# Patient Record
Sex: MALE | Race: BLACK OR AFRICAN AMERICAN
[De-identification: names, ages, dates, MRNs, and addresses within clinical notes are randomized per-mention and may not be internally consistent; named-entity substitution may affect disease eponyms.]

---

## 2019-06-14 ENCOUNTER — HOSPITAL ENCOUNTER (EMERGENCY)
Dept: HOSPITAL 97 - ER | Age: 39
Discharge: HOME | End: 2019-06-14
Payer: COMMERCIAL

## 2019-06-14 DIAGNOSIS — R73.03: ICD-10-CM

## 2019-06-14 DIAGNOSIS — F17.210: ICD-10-CM

## 2019-06-14 DIAGNOSIS — L08.9: Primary | ICD-10-CM

## 2019-06-14 DIAGNOSIS — R03.0: ICD-10-CM

## 2019-06-14 PROCEDURE — 99283 EMERGENCY DEPT VISIT LOW MDM: CPT

## 2019-06-14 NOTE — EDPHYS
Physician Documentation                                                                           

 [facility] – [place], [place]                                                                 

Name: Charleen Ni                                                                                 

Age: 38 yrs                                                                                       

Sex: Male                                                                                         

: 1980                                                                                   

MRN: [id number]                                                                                   

Arrival Date: 2019                                                                          

Time: 15:10                                                                                       

Account#: [id number]                                                                            

Bed 25                                                                                            

Private MD: Michael Peter                                                                         

ED Physician Manan Staley                                                                       

HPI:                                                                                              

                                                                                             

15:29 This 38 yrs old Black Male presents to ER via Ambulatory with complaints of Skin        kb  

      Sore(s).                                                                                    

15:29 the patient presents with a swollen area of the right base of the skull. Description:   kb  

      swollen. Onset: The symptoms/episode began/occurred 1 week(s) ago. Possible cause(s):       

      unknown. Associated signs and symptoms: The patient has no apparent associated signs or     

      symptoms. Modifying factors: the symptoms are alleviated by nothing, the symptoms are       

      aggravated by pressure, touching. Severity of symptoms: At their worst the symptoms         

      were mild, in the emergency department the symptoms are unchanged. The patient has not      

      experienced similar symptoms in the past. The patient has not recently seen a               

      physician. Pt reports he noticed a knot on his scalp a week ago and it hasn't gotten        

      better. Thought it was an ingrown hair so he had someone try to get it out without          

      success. Reports pain on palpation.                                                         

                                                                                                  

Historical:                                                                                       

- Allergies:                                                                                      

15:18 NKDA;                                                                                   aa5 

- PMHx:                                                                                           

15:18 Borderline diabetes; Borderline hypertension;                                           aa5 

- PSHx:                                                                                           

15:18 None;                                                                                   aa5 

                                                                                                  

- Immunization history:: Adult Immunizations up to date.                                          

- Social history:: Smoking status: Patient uses tobacco products, smokes one-half pack            

  cigarettes per day.                                                                             

- Ebola Screening: : No symptoms or risks identified at this time.                                

                                                                                                  

                                                                                                  

ROS:                                                                                              

15:29 Constitutional: Negative for fever, chills, and weight loss, Cardiovascular: Negative   kb  

      for chest pain, palpitations, and edema, Respiratory: Negative for shortness of breath,     

      cough, wheezing, and pleuritic chest pain, Abdomen/GI: Negative for abdominal pain,         

      nausea, vomiting, diarrhea, and constipation, MS/Extremity: Negative for injury and         

      deformity, Neuro: Negative for headache, weakness, numbness, tingling, and seizure.         

15:29 Skin: Positive for swelling, of the right base of the skull.                                

                                                                                                  

Exam:                                                                                             

15:29 Constitutional:  This is a well developed, well nourished patient who is awake, alert,  kb  

      and in no acute distress. Head/Face:  Normocephalic, atraumatic. Chest/axilla:  Normal      

      chest wall appearance and motion.  Nontender with no deformity.  No lesions are             

      appreciated. Cardiovascular:  Regular rate and rhythm with a normal S1 and S2.  No          

      gallops, murmurs, or rubs.  Normal PMI, no JVD.  No pulse deficits. Respiratory:  Lungs     

      have equal breath sounds bilaterally, clear to auscultation and percussion.  No rales,      

      rhonchi or wheezes noted.  No increased work of breathing, no retractions or nasal          

      flaring. Abdomen/GI:  Soft, non-tender, with normal bowel sounds.  No distension or         

      tympany.  No guarding or rebound.  No evidence of tenderness throughout. MS/ Extremity:     

       Pulses equal, no cyanosis.  Neurovascular intact.  Full, normal range of motion.           

      Neuro:  Awake and alert, GCS 15, oriented to person, place, time, and situation.            

      Cranial nerves II-XII grossly intact.  Motor strength 5/5 in all extremities.  Sensory      

      grossly intact.  Cerebellar exam normal.  Normal gait.                                      

15:29 Skin: Appearance: normal except for affected area, Color: normal in color, swelling,        

      noted on the right base of the skull, that are mild.                                        

                                                                                                  

Vital Signs:                                                                                      

15:18  / 84; Pulse 77; Resp 16 S; Temp 98.6(TE); Pulse Ox 99% on R/A; Weight 99.79 kg   aa5 

      (R); Height 6 ft. 3 in. (190.50 cm) (R); Pain 0/10;                                         

15:18 Body Mass Index 27.50 (99.79 kg, 190.50 cm)                                             aa5 

                                                                                                  

MDM:                                                                                              

15:19 Patient medically screened.                                                             kb  

15:29 Data reviewed: vital signs, nurses notes. Data interpreted: Pulse oximetry: on room air kb  

      is 99 %. Interpretation: normal. Counseling: I had a detailed discussion with the           

      patient and/or guardian regarding: the historical points, exam findings, and any            

      diagnostic results supporting the discharge/admit diagnosis, the need for outpatient        

      follow up, a family practitioner, to return to the emergency department if symptoms         

      worsen or persist or if there are any questions or concerns that arise at home.             

                                                                                                  

Administered Medications:                                                                         

15:35 Drug: KeFLEX 500 mg Route: PO;                                                          ca1 

15:42 Follow up: Response: Medication administered at discharge.                              ca1 

                                                                                                  

                                                                                                  

Disposition:                                                                                      

18:07 Co-signature as Attending Physician, Manan Staley MD I agree with the assessment and   kdr 

      plan of care.                                                                               

                                                                                                  

Disposition:                                                                                      

19 15:32 Discharged to Home. Impression: Local infection of the skin and subcutaneous       

  tissue, unspecified.                                                                            

- Condition is Stable.                                                                            

- Discharge Instructions: Skin Abscess, Easy-to-Read.                                             

- Prescriptions for Keflex 500 mg Oral Capsule - take 1 capsule by ORAL route every 8             

  hours for 7 days; 21 capsule.                                                                   

- Medication Reconciliation Form, Thank You Letter, Antibiotic Education, Prescription            

  Opioid Use form.                                                                                

- Follow up: Emergency Department; When: As needed; Reason: Worsening of condition.               

  Follow up: Private Physician; When: 2 - 3 days; Reason: Recheck today's complaints,             

  Continuance of care, Re-evaluation by your physician.                                           

                                                                                                  

                                                                                                  

                                                                                                  

Signatures:                                                                                       

Rebecca Huston, FNP-C                 FNP-Manan Becerra MD MD   kdr                                                  

Ximena Sheikh RN                     RN   aa5                                                  

Simona Celaya RN                        RN   ca1                                                  

                                                                                                  

Corrections: (The following items were deleted from the chart)                                    

15:45 15:32 2019 15:32 Discharged to Home. Impression: Local infection of the skin and  ca1 

      subcutaneous tissue, unspecified. Condition is Stable. Forms are Medication                 

      Reconciliation Form, Thank You Letter, Antibiotic Education, Prescription Opioid Use.       

      Follow up: Emergency Department; When: As needed; Reason: Worsening of condition.           

      Follow up: Private Physician; When: 2 - 3 days; Reason: Recheck today's complaints,         

      Continuance of care, Re-evaluation by your physician. kb                                    

                                                                                                  

**************************************************************************************************

## 2019-06-14 NOTE — ER
Nurse's Notes                                                                                     

 [facility]                                                                 

Name: Charleen Ni                                                                                 

Age: 38 yrs                                                                                       

Sex: Male                                                                                         

: 1980                                                                                   

MRN: [id number]                                                                                   

Arrival Date: 2019                                                                          

Time: 15:10                                                                                       

Account#: [id number]                                                                            

Bed 25                                                                                            

Private MD: Michael Peter                                                                         

Diagnosis: Local infection of the skin and subcutaneous tissue, unspecified                       

                                                                                                  

Presentation:                                                                                     

                                                                                             

15:17 Presenting complaint: Patient states: "I've had a sore on the right side of head for    aa5 

      about a week now". Pt denies drainage to site. Transition of care: patient was not          

      received from another setting of care. Onset of symptoms was 2019. Risk                

      Assessment: Do you want to hurt yourself or someone else? Patient reports no desire to      

      harm self or others. Initial Sepsis Screen: Does the patient meet any 2 criteria? No.       

      Patient's initial sepsis screen is negative. Does the patient have a suspected source       

      of infection? No. Patient's initial sepsis screen is negative. Care prior to arrival:       

      None.                                                                                       

15:17 Method Of Arrival: Ambulatory                                                           aa5 

15:17 Acuity: BURAK 5                                                                           aa5 

                                                                                                  

Historical:                                                                                       

- Allergies:                                                                                      

15:18 NKDA;                                                                                   aa5 

- PMHx:                                                                                           

15:18 Borderline diabetes; Borderline hypertension;                                           aa5 

- PSHx:                                                                                           

15:18 None;                                                                                   aa5 

                                                                                                  

- Immunization history:: Adult Immunizations up to date.                                          

- Social history:: Smoking status: Patient uses tobacco products, smokes one-half pack            

  cigarettes per day.                                                                             

- Ebola Screening: : No symptoms or risks identified at this time.                                

                                                                                                  

                                                                                                  

Screening:                                                                                        

15:25 Abuse screen: Denies threats or abuse. Denies injuries from another. Nutritional        ca1 

      screening: No deficits noted. Tuberculosis screening: No symptoms or risk factors           

      identified. Fall Risk None identified.                                                      

                                                                                                  

Assessment:                                                                                       

15:25 General: Appears in no apparent distress. comfortable, Behavior is calm, cooperative,   ca1 

      appropriate for age. Pain: Denies pain. Neuro: Level of Consciousness is awake, alert,      

      obeys commands, Oriented to person, place, time, situation. Cardiovascular: Heart tones     

      S1 S2 present Capillary refill < 3 seconds Patient's skin is warm and dry. Respiratory:     

      Airway is patent Respiratory effort is even, unlabored, Respiratory pattern is regular,     

      symmetrical, Breath sounds are clear bilaterally. Derm: Skin is intact, is healthy with     

      good turgor, Skin is pink, warm \T\ dry. Reports tingling, since Monday. on the skin at     

      the back of the R ear. "I feel like there are bumps on there", as pt stated.                

      Musculoskeletal: Circulation, motion, and sensation intact. Capillary refill < 3            

      seconds, Range of motion: intact in all extremities.                                        

                                                                                                  

Vital Signs:                                                                                      

15:18  / 84; Pulse 77; Resp 16 S; Temp 98.6(TE); Pulse Ox 99% on R/A; Weight 99.79 kg   aa5 

      (R); Height 6 ft. 3 in. (190.50 cm) (R); Pain 0/10;                                         

15:18 Body Mass Index 27.50 (99.79 kg, 190.50 cm)                                             aa5 

                                                                                                  

ED Course:                                                                                        

15:10 Patient arrived in ED.                                                                  mr  

15:11 Michael Peter MD is Private Physician.                                                 mr  

15:17 Triage completed.                                                                       aa5 

15:17 Arm band placed on.                                                                     aa5 

15:19 Rebecca Huston FNP-C is [facility].                                                        kb  

15:19 Manan Staley MD is Attending Physician.                                              kb  

15:25 Patient has correct armband on for positive identification. Bed in low position. Call   ca1 

      light in reach. Side rails up X 1. Pulse ox on. NIBP on.                                    

15:30 Acob, Simona, RN is Primary Nurse.                                                      ca1 

15:44 No provider procedures requiring assistance completed. Patient did not have IV access   ca1 

      during this emergency room visit.                                                           

                                                                                                  

Administered Medications:                                                                         

15:35 Drug: KeFLEX 500 mg Route: PO;                                                          ca1 

15:42 Follow up: Response: Medication administered at discharge.                              ca1 

                                                                                                  

                                                                                                  

Outcome:                                                                                          

15:32 Discharge ordered by MD.                                                                kb  

15:44 Discharged to home ambulatory.                                                          ca1 

15:44 Condition: stable                                                                           

15:44 Discharge instructions given to patient, Instructed on discharge instructions, follow       

      up and referral plans. medication usage, Demonstrated understanding of instructions,        

      follow-up care, medications, Prescriptions given X 1.                                       

15:45 Patient left the ED.                                                                    ca1 

                                                                                                  

Signatures:                                                                                       

Rebecca Huston FNP-C FNP-Bee PiperaAvril                                 mr SheikhXimena, RN                     RN   aa5                                                  

Simona Celaya RN                        RN   ca1                                                  

                                                                                                  

**************************************************************************************************

## 2020-06-08 ENCOUNTER — HOSPITAL ENCOUNTER (EMERGENCY)
Dept: HOSPITAL 97 - ER | Age: 40
Discharge: HOME | End: 2020-06-08
Payer: SELF-PAY

## 2020-06-08 VITALS — OXYGEN SATURATION: 98 % | DIASTOLIC BLOOD PRESSURE: 103 MMHG | SYSTOLIC BLOOD PRESSURE: 148 MMHG

## 2020-06-08 VITALS — TEMPERATURE: 98.1 F

## 2020-06-08 DIAGNOSIS — F17.210: ICD-10-CM

## 2020-06-08 DIAGNOSIS — R73.03: ICD-10-CM

## 2020-06-08 DIAGNOSIS — R07.9: Primary | ICD-10-CM

## 2020-06-08 DIAGNOSIS — R03.0: ICD-10-CM

## 2020-06-08 LAB
ALBUMIN SERPL BCP-MCNC: 3.9 G/DL (ref 3.4–5)
ALP SERPL-CCNC: 57 U/L (ref 45–117)
ALT SERPL W P-5'-P-CCNC: 36 U/L (ref 12–78)
AST SERPL W P-5'-P-CCNC: 22 U/L (ref 15–37)
BUN BLD-MCNC: 15 MG/DL (ref 7–18)
GLUCOSE SERPLBLD-MCNC: 105 MG/DL (ref 74–106)
HCT VFR BLD CALC: 51.1 % (ref 39.6–49)
INR BLD: 0.97
LYMPHOCYTES # SPEC AUTO: 0.9 K/UL (ref 0.7–4.9)
MAGNESIUM SERPL-MCNC: 2 MG/DL (ref 1.8–2.4)
METHAMPHET UR QL SCN: NEGATIVE
NT-PROBNP SERPL-MCNC: 43 PG/ML (ref ?–125)
PMV BLD: 8.2 FL (ref 7.6–11.3)
POTASSIUM SERPL-SCNC: 4.5 MMOL/L (ref 3.5–5.1)
RBC # BLD: 5.42 M/UL (ref 4.33–5.43)
THC SERPL-MCNC: NEGATIVE NG/ML
TROPONIN (EMERG DEPT USE ONLY): < 0.02 NG/ML (ref 0–0.04)

## 2020-06-08 PROCEDURE — 93005 ELECTROCARDIOGRAM TRACING: CPT

## 2020-06-08 PROCEDURE — 84484 ASSAY OF TROPONIN QUANT: CPT

## 2020-06-08 PROCEDURE — 80048 BASIC METABOLIC PNL TOTAL CA: CPT

## 2020-06-08 PROCEDURE — 85379 FIBRIN DEGRADATION QUANT: CPT

## 2020-06-08 PROCEDURE — 85610 PROTHROMBIN TIME: CPT

## 2020-06-08 PROCEDURE — 83880 ASSAY OF NATRIURETIC PEPTIDE: CPT

## 2020-06-08 PROCEDURE — 83735 ASSAY OF MAGNESIUM: CPT

## 2020-06-08 PROCEDURE — 81003 URINALYSIS AUTO W/O SCOPE: CPT

## 2020-06-08 PROCEDURE — 80307 DRUG TEST PRSMV CHEM ANLYZR: CPT

## 2020-06-08 PROCEDURE — 99285 EMERGENCY DEPT VISIT HI MDM: CPT

## 2020-06-08 PROCEDURE — 80076 HEPATIC FUNCTION PANEL: CPT

## 2020-06-08 PROCEDURE — 85025 COMPLETE CBC W/AUTO DIFF WBC: CPT

## 2020-06-08 PROCEDURE — 36415 COLL VENOUS BLD VENIPUNCTURE: CPT

## 2020-06-08 PROCEDURE — 71045 X-RAY EXAM CHEST 1 VIEW: CPT

## 2020-06-08 NOTE — RAD REPORT
EXAM DESCRIPTION:  Jolie Single View6/8/2020 8:14 pm

 

CLINICAL HISTORY:  Chest pain

 

COMPARISON:  none

 

FINDINGS:   The lungs appear clear of acute infiltrate. The heart is normal size

 

IMPRESSION:   No acute abnormalities displayed

## 2020-06-08 NOTE — EDPHYS
Physician Documentation                                                                           

 [facility]                                                                 

Name: Charleen Ni                                                                                 

Age: 39 yrs                                                                                       

Sex: Male                                                                                         

: 1980                                                                                   

MRN: [id number]                                                                                   

Arrival Date: 2020                                                                          

Time: 17:35                                                                                       

Account#: [id number]                                                                            

Bed 13                                                                                            

Private MD:                                                                                       

ED Physician Samy Diamond                                                                      

HPI:                                                                                              

                                                                                             

20:07 This 39 yrs old Black Male presents to ER via Ambulatory with complaints of Chest       mh7 

      Pressure, Back Pain.                                                                        

20:07 The patient or guardian reports chest pain that is located primarily in the left        mh7 

      lateral anterior chest and left lateral posterior chest. The pain does not radiate.         

      Associated signs and symptoms: Pertinent negatives: abdominal pain, cough, diaphoresis,     

      dizziness, headache, lower extremity pain, lower extremity swelling, lightheadedness,       

      nausea, near syncope, palpitations, recent travel, shortness of breath, syncope,            

      vomiting. The chest pain is described as sharp. Duration: The patient or guardian           

      reports a single episode, that is still ongoing, and unchanged. Modifying factors: The      

      symptoms are alleviated by nothing. the symptoms are aggravated by movement. Severity       

      of pain: At its worst the pain was moderate today, in the emergency department the pain     

      is unchanged No treatment attempted.                                                        

20:07 Patient states that he started having left lateral chest and left upper back pain today mh7 

      around noon. He denies injuries, fever, cough, nausea, vomiting, abdominal pain, SOB,       

      or other complaints..                                                                       

                                                                                                  

Historical:                                                                                       

- Allergies:                                                                                      

17:49 NKDA;                                                                                   ss  

- PMHx:                                                                                           

17:49 Borderline Diabetes; borderline hypertension;                                           ss  

- PSHx:                                                                                           

17:49 None;                                                                                   ss  

                                                                                                  

- Immunization history:: Adult Immunizations up to date.                                          

- Social history:: Smoking status: Patient reports the use of cigarette tobacco                   

  products, smokes one-half pack cigarettes per day.                                              

                                                                                                  

                                                                                                  

ROS:                                                                                              

20:07 Constitutional: Negative for fever, chills, and weight loss, Eyes: Negative for injury, mh7 

      pain, redness, and discharge, ENT: Negative for injury, pain, and discharge, Neck:          

      Negative for injury, pain, and swelling, Respiratory: Negative for shortness of breath,     

      cough, wheezing, and pleuritic chest pain, Abdomen/GI: Negative for abdominal pain,         

      nausea, vomiting, diarrhea, and constipation, Back: Negative for injury and pain, :       

      Negative for injury, bleeding, discharge, and swelling, MS/Extremity: Negative for          

      injury and deformity, Skin: Negative for injury, rash, and discoloration, Neuro:            

      Negative for headache, weakness, numbness, tingling, and seizure, Psych: Negative for       

      depression, anxiety, suicide ideation, homicidal ideation, and hallucinations,              

      Allergy/Immunology: Negative for hives, rash, and allergies, Endocrine: Negative for        

      neck swelling, polydipsia, polyuria, polyphagia, and marked weight changes,                 

      Hematologic/Lymphatic: Negative for swollen nodes, abnormal bleeding, and unusual           

      bruising.                                                                                   

                                                                                                  

Exam:                                                                                             

20:07 Constitutional:  This is a well developed, well nourished patient who is awake, alert,  mh7 

      and in no acute distress. Head/Face:  Normocephalic, atraumatic. Eyes:  Pupils equal        

      round and reactive to light, extra-ocular motions intact.  Lids and lashes normal.          

      Conjunctiva and sclera are non-icteric and not injected.  Cornea within normal limits.      

      Periorbital areas with no swelling, redness, or edema. Neck:  Trachea midline, no           

      thyromegaly or masses palpated, and no cervical lymphadenopathy.  Supple, full range of     

      motion without nuchal rigidity, or vertebral point tenderness.  No Meningismus.             

20:07 Cardiovascular:  Regular rate and rhythm with a normal S1 and S2.  No gallops, murmurs,     

      or rubs.  Normal PMI, no JVD.  No pulse deficits. Respiratory:  Lungs have equal breath     

      sounds bilaterally, clear to auscultation and percussion.  No rales, rhonchi or wheezes     

      noted.  No increased work of breathing, no retractions or nasal flaring. Abdomen/GI:        

      Soft, non-tender, with normal bowel sounds.  No distension or tympany.  No guarding or      

      rebound.  No evidence of tenderness throughout. Back:  No spinal tenderness.  No            

      costovertebral tenderness.  Full range of motion. Skin:  Warm, dry with normal turgor.      

      Normal color with no rashes, no lesions, and no evidence of cellulitis. MS/ Extremity:      

      Pulses equal, no cyanosis.  Neurovascular intact.  Full, normal range of motion. Neuro:     

       Awake and alert, GCS 15, oriented to person, place, time, and situation.  Cranial          

      nerves II-XII grossly intact.  Motor strength 5/5 in all extremities.  Sensory grossly      

      intact.  Cerebellar exam normal.  Normal gait. Psych:  Awake, alert, with orientation       

      to person, place and time.  Behavior, mood, and affect are within normal limits.            

20:07 Chest/axilla: Inspection: normal, Palpation: tenderness, that is mild, of the  left         

      lateral posterior chest and left lateral anterior chest, that partially reproduces the      

      patient's complaints, Axilla: are normal, Lymph nodes: lymphadenopathy is not               

      appreciated.                                                                                

                                                                                             

02:20 ECG was reviewed by the Attending Physician.                                            [facility] 

                                                                                                  

Vital Signs:                                                                                      

                                                                                             

17:46  / 98; Pulse 79; Resp 16; Temp 98.1(TE); Pulse Ox 100% on R/A; Weight 99.79 kg;   ss  

      Height 6 ft. 3 in. (190.50 cm); Pain 3/10;                                                  

21:32  / 103; Pulse 60; Resp 18; Pulse Ox 98% ;                                         ea  

17:46 Body Mass Index 27.50 (99.79 kg, 190.50 cm)                                             ss  

                                                                                                  

MDM:                                                                                              

19:47 Patient medically screened.                                                             7 

21:45 Differential diagnosis: acute myocardial infarction, coronary artery disease chest wall mh7 

      pain, costochondritis, pneumonia, pneumothorax, pulmonary embolus. HEART Score:             

      History: Slightly Suspicious (0), ECG: Normal (0), Age: < or = 45 years (0), Risk           

      Factors: 1 or 2 risk factors (1), [Hypertension] Troponin: < or = 1 x Normal Limit (0),     

      Total Score = 1. Data reviewed: vital signs, nurses notes, lab test result(s), cardiac      

      enzymes, CBC, drug level(s), electrolytes, EKG, radiologic studies, plain films.            

      Counseling: I had a detailed discussion with the patient and/or guardian regarding: the     

      historical points, exam findings, and any diagnostic results supporting the                 

      discharge/admit diagnosis, the presence of at least one elevated blood pressure reading     

      (>120/80) during this emergency department visit, lab results, radiology results, the       

      need for outpatient follow up, to return to the emergency department if symptoms worsen     

      or persist or if there are any questions or concerns that arise at home.                    

                                                                                             

02:18 Data interpreted: Cardiac monitor: rate is 60 beats/min, rhythm is normal sinus rhythm, mh7 

      regular, Interpretation: normal rate, normal rhythm, Pulse oximetry: on room air is 98      

      %. Interpretation: normal. Response to treatment: the patient's symptoms have resolved      

      after treatment, the patient's blood pressure is in an acceptable range, mental status      

      has returned to baseline, the patient no longer shows bradycardia, the patient is not       

      short of breath, the patient is not tachycardic, the patient's pain is gone, the            

      patient's temperature has normalized.                                                       

                                                                                                  

                                                                                             

19:49 Order name: Basic Metabolic Panel; Complete Time: 21:02                                 [facility] 

                                                                                             

19:49 Order name: CBC with Diff; Complete Time: 21:02                                         [facility] 

                                                                                             

19:49 Order name: LFT's; Complete Time: 21:02                                                 [facility] 

                                                                                             

19:49 Order name: Magnesium; Complete Time: 21:02                                             [facility] 

                                                                                             

19:49 Order name: NT PRO-BNP; Complete Time: 21:02                                            [facility] 

                                                                                             

19:49 Order name: PT-INR; Complete Time: 21:02                                                [facility] 

                                                                                             

17:46 Order name: EKG; Complete Time: 17:47                                                     

                                                                                             

17:46 Order name: EKG - Nurse/Tech; Complete Time: 17:59                                        

                                                                                             

19:49 Order name: Troponin (emerg Dept Use Only); Complete Time: 21:02                        [facility] 

                                                                                             

19:49 Order name: XRAY Chest (1 view); Complete Time: 21:02                                   [facility] 

                                                                                             

19:49 Order name: DD; Complete Time: 21:02                                                    [facility] 

                                                                                             

19:49 Order name: UDS; Complete Time: 21:40                                                   [facility] 

                                                                                             

21:14 Order name: Urine Dipstick--Ancillary (enter results); Complete Time: 21:40             mt  

                                                                                             

19:49 Order name: Cardiac monitoring; Complete Time: 20:18                                    [facility] 

                                                                                             

19:49 Order name: IV Saline Lock; Complete Time: 20:08                                        [facility] 

                                                                                             

19:49 Order name: Labs collected and sent; Complete Time: 20:08                               [facility] 

                                                                                             

19:49 Order name: O2 Per Protocol; Complete Time: 20:08                                       [facility] 

                                                                                             

19:49 Order name: O2 Sat Monitoring; Complete Time: 20:08                                     7 

                                                                                                  

EC:20 Rate is 88 beats/min. Rhythm is regular, Normal Sinus Rhythm. QRS Axis is Normal. AK    mh7 

      interval is normal. QRS interval is normal. QT interval is normal. No Q waves. T waves      

      are Normal. No ST changes noted. Clinical impression: Normal ECG.                           

                                                                                                  

Administered Medications:                                                                         

                                                                                             

20:00 Drug: Tylenol 1000 mg Route: PO;                                                        ea  

22:00 Follow up: Response: No adverse reaction                                                ea  

                                                                                                  

                                                                                                  

Disposition:                                                                                      

20 21:47 Discharged to Home. Impression: Chest pain, unspecified.                           

- Condition is Stable.                                                                            

- Discharge Instructions: Nonspecific Chest Pain, Easy-to-Read.                                   

                                                                                                  

- Medication Reconciliation Form, Thank You Letter, Antibiotic Education, Prescription            

  Opioid Use form.                                                                                

- Follow up: Private Physician; When: 1 - 2 days; Reason: Worsening of condition,                 

  Recheck today's complaints, Re-evaluation by your physician.                                    

- Problem is new.                                                                                 

- Symptoms are resolved.                                                                          

                                                                                                  

                                                                                                  

                                                                                                  

Signatures:                                                                                       

Dispatcher [employer]                           EDElsa Lake RN                      RN   Sera Brown RN RN ea Holmes, Maurice, MD MD   mh7                                                  

                                                                                                  

Corrections: (The following items were deleted from the chart)                                    

22:05 21:47 2020 21:47 Discharged to Home. Impression: Chest pain, unspecified.         ea  

      Condition is Stable. Forms are Medication Reconciliation Form, Thank You Letter,            

      Antibiotic Education, Prescription Opioid Use. Follow up: Private Physician; When: 1 -      

      2 days; Reason: Worsening of condition, Recheck today's complaints, Re-evaluation by        

      your physician. Problem is new. Symptoms are resolved. mh7                                  

                                                                                                  

**************************************************************************************************

## 2020-06-08 NOTE — ER
Nurse's Notes                                                                                     

 [facility]                                                                 

Name: Charleen Ni                                                                                 

Age: 39 yrs                                                                                       

Sex: Male                                                                                         

: 1980                                                                                   

MRN: [id number]                                                                                   

Arrival Date: 2020                                                                          

Time: 17:35                                                                                       

Account#: [id number]                                                                            

Bed 13                                                                                            

Private MD:                                                                                       

Diagnosis: Chest pain, unspecified                                                                

                                                                                                  

Presentation:                                                                                     

                                                                                             

17:46 Chief complaint: Patient states: L lateral chest wall pain that has been continuous     ss  

      since this morning. Coronavirus screen: Proceed with normal triage. Patient denies a        

      cough. Patient denies shortness of breath or difficulty breathing. Patient denies           

      measured and/or subjective temperature greater than 100.4F prior to today's visit.          

      Patient denies travel on a cruise ship or to a country the [place] currently lists as an        

      affected area. Patient denies contact with known and/or suspected case of COVID-19.         

      Ebola Screen: Patient denies exposure to infectious person. Patient denies travel to an     

      Ebola-affected area in the 21 days before illness onset. Initial Sepsis Screen: Does        

      the patient meet any 2 criteria? No. Patient's initial sepsis screen is negative. Does      

      the patient have a suspected source of infection? No. Patient's initial sepsis screen       

      is negative. Risk Assessment: Do you want to hurt yourself or someone else? Patient         

      reports no desire to harm self or others. Onset of symptoms was 2020.              

17:46 Method Of Arrival: Ambulatory                                                           ss  

17:46 Acuity: BURAK 3                                                                           ss  

                                                                                                  

Historical:                                                                                       

- Allergies:                                                                                      

17:49 NKDA;                                                                                   ss  

- PMHx:                                                                                           

17:49 Borderline Diabetes; borderline hypertension;                                           ss  

- PSHx:                                                                                           

17:49 None;                                                                                   ss  

                                                                                                  

- Immunization history:: Adult Immunizations up to date.                                          

- Social history:: Smoking status: Patient reports the use of cigarette tobacco                   

  products, smokes one-half pack cigarettes per day.                                              

                                                                                                  

                                                                                                  

Screenin:08 Abuse screen: Denies threats or abuse. Nutritional screening: No deficits noted.        ea  

      Tuberculosis screening: No symptoms or risk factors identified. Fall Risk IV access (20     

      points).                                                                                    

                                                                                                  

Assessment:                                                                                       

20:09 General: Appears in no apparent distress. Behavior is appropriate for age. Pain:        ea  

      Complains of pain in left lateral posterior chest and left lateral anterior chest Pain      

      radiates to left lateral posterior chest Quality of pain is described as states "it         

      feels like muscle pain but I didn't lift anything" Pain began today. Neuro: Level of        

      Consciousness is awake, alert, obeys commands, Oriented to person, place, time,             

      situation. Cardiovascular: Patient's skin is warm and dry. Respiratory: Airway is           

      patent Respiratory effort is even, unlabored, Respiratory pattern is regular,               

      symmetrical.                                                                                

21:45 Reassessment: Patient and/or family updated on plan of care and expected duration. Pain ea  

      level reassessed. Patient is alert, oriented x 3, equal unlabored respirations, skin        

      warm/dry/pink.                                                                              

22:04 Reassessment: Patient and/or family updated on plan of care and expected duration. Pain ea  

      level reassessed. Patient is alert, oriented x 3, equal unlabored respirations, skin        

      warm/dry/pink. Discharge instruction given to patient, verbalized the understanding of      

      instruction. Pt left ED ambulatory tolerating well.                                         

                                                                                                  

Vital Signs:                                                                                      

17:46  / 98; Pulse 79; Resp 16; Temp 98.1(TE); Pulse Ox 100% on R/A; Weight 99.79 kg;   ss  

      Height 6 ft. 3 in. (190.50 cm); Pain 3/10;                                                  

21:32  / 103; Pulse 60; Resp 18; Pulse Ox 98% ;                                         ea  

17:46 Body Mass Index 27.50 (99.79 kg, 190.50 cm)                                               

                                                                                                  

ED Course:                                                                                        

17:35 Patient arrived in ED.                                                                  as  

17:48 Triage completed.                                                                       ss  

17:49 Arm band placed on left wrist.                                                            

19:16 Samy Diamond MD is Attending Physician.                                             [facility] 

19:50 Sera Hope RN is Primary Nurse.                                                    ea  

20:09 Inserted saline lock: 20 gauge in left antecubital area, using aseptic technique. Blood ea  

      collected.                                                                                  

20:10 Patient maintains SpO2 saturation greater than 95% on room air.                         ea  

20:13 Patient has correct armband on for positive identification. Bed in low position.        ea  

      Cardiac monitor on. Pulse ox on. NIBP on.                                                   

20:16 XRAY Chest (1 view) In Process Unspecified.                                             EDMS

22:00 IV discontinued, intact, bleeding controlled, No redness/swelling at site. Pressure     ea  

      dressing applied.                                                                           

22:05 No provider procedures requiring assistance completed.                                  ea  

                                                                                                  

Administered Medications:                                                                         

20:00 Drug: Tylenol 1000 mg Route: PO;                                                        ea  

22:00 Follow up: Response: No adverse reaction                                                ea  

                                                                                                  

                                                                                                  

Outcome:                                                                                          

21:47 Discharge ordered by MD. ramirez 

22:05 Discharged to home ambulatory.                                                          annie  

22:05 Condition: stable                                                                           

22:05 Instructed on discharge instructions, follow up and referral plans. Demonstrated            

      understanding of instructions, follow-up care.                                              

22:05 Patient left the ED.                                                                    annie  

                                                                                                  

Signatures:                                                                                       

Dispatcher [employer]                           Autumn Marquez Shelby, RN RN ss Antunez, Elena, RN RN ea Holmes, Maurice, MD MD   [id number]                                                  

                                                                                                  

**************************************************************************************************

## 2020-06-09 NOTE — EKG
Test Date:    2020-06-08               Test Time:    17:55:24

Technician:   SBS                                    

                                                     

MEASUREMENT RESULTS:                                       

Intervals:                                           

Rate:         88                                     

OK:           120                                    

QRSD:         94                                     

QT:           360                                    

QTc:          435                                    

Axis:                                                

P:            69                                     

OK:           120                                    

QRS:          65                                     

T:            55                                     

                                                     

INTERPRETIVE STATEMENTS:                                       

                                                     

Normal sinus rhythm

Normal ECG

No previous ECG available for comparison



Electronically Signed On 06-09-20 06:34:30 CDT by Brian Ward